# Patient Record
Sex: FEMALE | Race: WHITE | NOT HISPANIC OR LATINO | Employment: FULL TIME | URBAN - METROPOLITAN AREA
[De-identification: names, ages, dates, MRNs, and addresses within clinical notes are randomized per-mention and may not be internally consistent; named-entity substitution may affect disease eponyms.]

---

## 2021-07-01 ENCOUNTER — PATIENT MESSAGE (OUTPATIENT)
Dept: ADMINISTRATIVE | Facility: OTHER | Age: 46
End: 2021-07-01

## 2023-02-14 PROBLEM — G57.00 PIRIFORMIS SYNDROME: Status: ACTIVE | Noted: 2017-05-08

## 2023-02-14 PROBLEM — E46 MALNUTRITION, CALORIE: Status: ACTIVE | Noted: 2017-09-18

## 2023-02-14 PROBLEM — Z91.018 ALLERGY TO FOOD: Status: ACTIVE | Noted: 2019-11-26

## 2023-02-14 PROBLEM — G54.5 NEURALGIC AMYOTROPHY: Status: ACTIVE | Noted: 2017-05-08

## 2023-02-14 PROBLEM — E44.1: Status: ACTIVE | Noted: 2017-01-17

## 2023-02-14 PROBLEM — R19.7 DIARRHEA: Status: ACTIVE | Noted: 2019-11-26

## 2024-11-07 ENCOUNTER — TELEPHONE (OUTPATIENT)
Dept: PHARMACY | Facility: CLINIC | Age: 49
End: 2024-11-07

## 2024-11-07 NOTE — TELEPHONE ENCOUNTER
[facility]/Population Health Chart Review & Patient Outreach Details For Medication Adherence Project    Reason for Outreach Encounter: 3rd Party payor non-compliance report ([employer], [employer], C, etc)  2.  Patient Outreach Method: Reviewed Patient Chart  3.   Medication in question: metformin   LAST FILLED: 10/31/24 for 30 day supply  Diabetes Medications               metFORMIN (GLUCOPHAGE) 1000 MG tablet Take 1,000 mg by mouth 2 (two) times daily with meals.              4.  Reviewed and or Updates Made To: Patient Chart  5. Outreach Outcomes and/or actions taken: Patient filled medication and is on track to be adherent